# Patient Record
Sex: FEMALE | Race: ASIAN | NOT HISPANIC OR LATINO
[De-identification: names, ages, dates, MRNs, and addresses within clinical notes are randomized per-mention and may not be internally consistent; named-entity substitution may affect disease eponyms.]

---

## 2024-01-03 PROBLEM — Z00.00 ENCOUNTER FOR PREVENTIVE HEALTH EXAMINATION: Status: ACTIVE | Noted: 2024-01-03

## 2024-01-08 ENCOUNTER — LABORATORY RESULT (OUTPATIENT)
Age: 39
End: 2024-01-08

## 2024-01-08 ENCOUNTER — APPOINTMENT (OUTPATIENT)
Dept: OBGYN | Facility: CLINIC | Age: 39
End: 2024-01-08
Payer: SELF-PAY

## 2024-01-08 ENCOUNTER — ASOB RESULT (OUTPATIENT)
Age: 39
End: 2024-01-08

## 2024-01-08 VITALS
DIASTOLIC BLOOD PRESSURE: 72 MMHG | BODY MASS INDEX: 26.03 KG/M2 | HEART RATE: 84 BPM | OXYGEN SATURATION: 100 % | SYSTOLIC BLOOD PRESSURE: 112 MMHG | HEIGHT: 66 IN | WEIGHT: 162 LBS

## 2024-01-08 DIAGNOSIS — C73 MALIGNANT NEOPLASM OF THYROID GLAND: ICD-10-CM

## 2024-01-08 DIAGNOSIS — E07.9 DISORDER OF THYROID, UNSPECIFIED: ICD-10-CM

## 2024-01-08 PROCEDURE — 99213 OFFICE O/P EST LOW 20 MIN: CPT

## 2024-01-08 PROCEDURE — 76815 OB US LIMITED FETUS(S): CPT

## 2024-01-08 RX ORDER — LEVOTHYROXINE SODIUM 50 UG/1
50 TABLET ORAL
Refills: 0 | Status: ACTIVE | COMMUNITY

## 2024-01-08 RX ORDER — LEVOTHYROXINE SODIUM 0.17 MG/1
TABLET ORAL
Refills: 0 | Status: ACTIVE | COMMUNITY

## 2024-01-16 ENCOUNTER — NON-APPOINTMENT (OUTPATIENT)
Age: 39
End: 2024-01-16

## 2024-01-22 ENCOUNTER — APPOINTMENT (OUTPATIENT)
Dept: OBGYN | Facility: CLINIC | Age: 39
End: 2024-01-22
Payer: SELF-PAY

## 2024-01-22 VITALS
WEIGHT: 163 LBS | OXYGEN SATURATION: 99 % | HEART RATE: 85 BPM | BODY MASS INDEX: 26.31 KG/M2 | DIASTOLIC BLOOD PRESSURE: 76 MMHG | SYSTOLIC BLOOD PRESSURE: 109 MMHG

## 2024-01-22 PROCEDURE — 99213 OFFICE O/P EST LOW 20 MIN: CPT

## 2024-01-24 ENCOUNTER — NON-APPOINTMENT (OUTPATIENT)
Age: 39
End: 2024-01-24

## 2024-01-29 ENCOUNTER — APPOINTMENT (OUTPATIENT)
Dept: OBGYN | Facility: CLINIC | Age: 39
End: 2024-01-29
Payer: SELF-PAY

## 2024-01-29 ENCOUNTER — LABORATORY RESULT (OUTPATIENT)
Age: 39
End: 2024-01-29

## 2024-01-29 VITALS
SYSTOLIC BLOOD PRESSURE: 109 MMHG | DIASTOLIC BLOOD PRESSURE: 76 MMHG | OXYGEN SATURATION: 100 % | BODY MASS INDEX: 26.79 KG/M2 | WEIGHT: 166 LBS | HEART RATE: 91 BPM

## 2024-01-29 PROCEDURE — 99213 OFFICE O/P EST LOW 20 MIN: CPT

## 2024-02-05 ENCOUNTER — APPOINTMENT (OUTPATIENT)
Dept: OBGYN | Facility: CLINIC | Age: 39
End: 2024-02-05
Payer: SELF-PAY

## 2024-02-05 VITALS
WEIGHT: 168 LBS | HEART RATE: 97 BPM | BODY MASS INDEX: 27.12 KG/M2 | OXYGEN SATURATION: 98 % | DIASTOLIC BLOOD PRESSURE: 76 MMHG | SYSTOLIC BLOOD PRESSURE: 116 MMHG

## 2024-02-05 PROCEDURE — 99213 OFFICE O/P EST LOW 20 MIN: CPT

## 2024-02-12 ENCOUNTER — APPOINTMENT (OUTPATIENT)
Dept: OBGYN | Facility: CLINIC | Age: 39
End: 2024-02-12
Payer: SELF-PAY

## 2024-02-12 VITALS
WEIGHT: 170 LBS | DIASTOLIC BLOOD PRESSURE: 72 MMHG | HEART RATE: 79 BPM | OXYGEN SATURATION: 99 % | SYSTOLIC BLOOD PRESSURE: 110 MMHG | BODY MASS INDEX: 27.44 KG/M2

## 2024-02-12 PROCEDURE — 99213 OFFICE O/P EST LOW 20 MIN: CPT

## 2024-02-20 ENCOUNTER — NON-APPOINTMENT (OUTPATIENT)
Age: 39
End: 2024-02-20

## 2024-02-20 ENCOUNTER — APPOINTMENT (OUTPATIENT)
Dept: OBGYN | Facility: CLINIC | Age: 39
End: 2024-02-20
Payer: SELF-PAY

## 2024-02-20 VITALS — DIASTOLIC BLOOD PRESSURE: 80 MMHG | BODY MASS INDEX: 27.44 KG/M2 | WEIGHT: 170 LBS | SYSTOLIC BLOOD PRESSURE: 119 MMHG

## 2024-02-20 PROCEDURE — 99213 OFFICE O/P EST LOW 20 MIN: CPT

## 2024-02-21 ENCOUNTER — APPOINTMENT (OUTPATIENT)
Dept: ANTEPARTUM | Facility: CLINIC | Age: 39
End: 2024-02-21

## 2024-02-22 ENCOUNTER — INPATIENT (INPATIENT)
Facility: HOSPITAL | Age: 39
LOS: 1 days | Discharge: HOME | End: 2024-02-24
Attending: OBSTETRICS & GYNECOLOGY | Admitting: OBSTETRICS & GYNECOLOGY
Payer: SELF-PAY

## 2024-02-22 VITALS
OXYGEN SATURATION: 98 % | SYSTOLIC BLOOD PRESSURE: 110 MMHG | RESPIRATION RATE: 17 BRPM | DIASTOLIC BLOOD PRESSURE: 84 MMHG | HEART RATE: 73 BPM | TEMPERATURE: 98 F

## 2024-02-22 DIAGNOSIS — Z34.80 ENCOUNTER FOR SUPERVISION OF OTHER NORMAL PREGNANCY, UNSPECIFIED TRIMESTER: ICD-10-CM

## 2024-02-22 DIAGNOSIS — E89.0 POSTPROCEDURAL HYPOTHYROIDISM: Chronic | ICD-10-CM

## 2024-02-22 DIAGNOSIS — O26.899 OTHER SPECIFIED PREGNANCY RELATED CONDITIONS, UNSPECIFIED TRIMESTER: ICD-10-CM

## 2024-02-22 LAB
APTT BLD: 29.2 SEC — SIGNIFICANT CHANGE UP (ref 24.5–35.6)
BASOPHILS # BLD AUTO: 0.02 K/UL — SIGNIFICANT CHANGE UP (ref 0–0.2)
BASOPHILS NFR BLD AUTO: 0.3 % — SIGNIFICANT CHANGE UP (ref 0–2)
BLD GP AB SCN SERPL QL: SIGNIFICANT CHANGE UP
EOSINOPHIL # BLD AUTO: 0.06 K/UL — SIGNIFICANT CHANGE UP (ref 0–0.5)
EOSINOPHIL NFR BLD AUTO: 1 % — SIGNIFICANT CHANGE UP (ref 0–6)
HBV SURFACE AG SERPL QL IA: SIGNIFICANT CHANGE UP
HCT VFR BLD CALC: 33.9 % — LOW (ref 34.5–45)
HGB BLD-MCNC: 11.5 G/DL — SIGNIFICANT CHANGE UP (ref 11.5–15.5)
HIV 1+2 AB+HIV1 P24 AG SERPL QL IA: SIGNIFICANT CHANGE UP
IMM GRANULOCYTES NFR BLD AUTO: 1.5 % — HIGH (ref 0–0.9)
INR BLD: 0.93 RATIO — SIGNIFICANT CHANGE UP (ref 0.85–1.18)
LYMPHOCYTES # BLD AUTO: 1.27 K/UL — SIGNIFICANT CHANGE UP (ref 1–3.3)
LYMPHOCYTES # BLD AUTO: 21.2 % — SIGNIFICANT CHANGE UP (ref 13–44)
MCHC RBC-ENTMCNC: 30.4 PG — SIGNIFICANT CHANGE UP (ref 27–34)
MCHC RBC-ENTMCNC: 33.9 GM/DL — SIGNIFICANT CHANGE UP (ref 32–36)
MCV RBC AUTO: 89.7 FL — SIGNIFICANT CHANGE UP (ref 80–100)
MONOCYTES # BLD AUTO: 0.42 K/UL — SIGNIFICANT CHANGE UP (ref 0–0.9)
MONOCYTES NFR BLD AUTO: 7 % — SIGNIFICANT CHANGE UP (ref 2–14)
NEUTROPHILS # BLD AUTO: 4.13 K/UL — SIGNIFICANT CHANGE UP (ref 1.8–7.4)
NEUTROPHILS NFR BLD AUTO: 69 % — SIGNIFICANT CHANGE UP (ref 43–77)
NRBC # BLD: 0 /100 WBCS — SIGNIFICANT CHANGE UP (ref 0–0)
PLATELET # BLD AUTO: 173 K/UL — SIGNIFICANT CHANGE UP (ref 150–400)
PROTHROM AB SERPL-ACNC: 10.6 SEC — SIGNIFICANT CHANGE UP (ref 9.5–13)
RBC # BLD: 3.78 M/UL — LOW (ref 3.8–5.2)
RBC # FLD: 13.9 % — SIGNIFICANT CHANGE UP (ref 10.3–14.5)
RUBV IGG SER-ACNC: 5.4 INDEX — SIGNIFICANT CHANGE UP
RUBV IGG SER-IMP: POSITIVE — SIGNIFICANT CHANGE UP
WBC # BLD: 5.99 K/UL — SIGNIFICANT CHANGE UP (ref 3.8–10.5)
WBC # FLD AUTO: 5.99 K/UL — SIGNIFICANT CHANGE UP (ref 3.8–10.5)

## 2024-02-22 PROCEDURE — 59409 OBSTETRICAL CARE: CPT

## 2024-02-22 RX ORDER — OXYTOCIN 10 UNIT/ML
41.67 VIAL (ML) INJECTION
Qty: 20 | Refills: 0 | Status: DISCONTINUED | OUTPATIENT
Start: 2024-02-22 | End: 2024-02-24

## 2024-02-22 RX ORDER — SODIUM CHLORIDE 9 MG/ML
3 INJECTION INTRAMUSCULAR; INTRAVENOUS; SUBCUTANEOUS EVERY 8 HOURS
Refills: 0 | Status: DISCONTINUED | OUTPATIENT
Start: 2024-02-22 | End: 2024-02-24

## 2024-02-22 RX ORDER — CHLORHEXIDINE GLUCONATE 213 G/1000ML
1 SOLUTION TOPICAL DAILY
Refills: 0 | Status: DISCONTINUED | OUTPATIENT
Start: 2024-02-22 | End: 2024-02-22

## 2024-02-22 RX ORDER — LANOLIN
1 OINTMENT (GRAM) TOPICAL EVERY 6 HOURS
Refills: 0 | Status: DISCONTINUED | OUTPATIENT
Start: 2024-02-22 | End: 2024-02-24

## 2024-02-22 RX ORDER — OXYTOCIN 10 UNIT/ML
VIAL (ML) INJECTION
Qty: 30 | Refills: 0 | Status: DISCONTINUED | OUTPATIENT
Start: 2024-02-22 | End: 2024-02-22

## 2024-02-22 RX ORDER — SIMETHICONE 80 MG/1
80 TABLET, CHEWABLE ORAL EVERY 4 HOURS
Refills: 0 | Status: DISCONTINUED | OUTPATIENT
Start: 2024-02-22 | End: 2024-02-24

## 2024-02-22 RX ORDER — LEVOTHYROXINE SODIUM 125 MCG
25 TABLET ORAL DAILY
Refills: 0 | Status: DISCONTINUED | OUTPATIENT
Start: 2024-02-22 | End: 2024-02-24

## 2024-02-22 RX ORDER — TETANUS TOXOID, REDUCED DIPHTHERIA TOXOID AND ACELLULAR PERTUSSIS VACCINE, ADSORBED 5; 2.5; 8; 8; 2.5 [IU]/.5ML; [IU]/.5ML; UG/.5ML; UG/.5ML; UG/.5ML
0.5 SUSPENSION INTRAMUSCULAR ONCE
Refills: 0 | Status: DISCONTINUED | OUTPATIENT
Start: 2024-02-22 | End: 2024-02-24

## 2024-02-22 RX ORDER — DIPHENHYDRAMINE HCL 50 MG
25 CAPSULE ORAL EVERY 6 HOURS
Refills: 0 | Status: DISCONTINUED | OUTPATIENT
Start: 2024-02-22 | End: 2024-02-24

## 2024-02-22 RX ORDER — KETOROLAC TROMETHAMINE 30 MG/ML
30 SYRINGE (ML) INJECTION ONCE
Refills: 0 | Status: DISCONTINUED | OUTPATIENT
Start: 2024-02-22 | End: 2024-02-24

## 2024-02-22 RX ORDER — HYDROCORTISONE 1 %
1 OINTMENT (GRAM) TOPICAL EVERY 6 HOURS
Refills: 0 | Status: DISCONTINUED | OUTPATIENT
Start: 2024-02-22 | End: 2024-02-24

## 2024-02-22 RX ORDER — CITRIC ACID/SODIUM CITRATE 300-500 MG
15 SOLUTION, ORAL ORAL EVERY 6 HOURS
Refills: 0 | Status: DISCONTINUED | OUTPATIENT
Start: 2024-02-22 | End: 2024-02-22

## 2024-02-22 RX ORDER — FERROUS SULFATE 325(65) MG
325 TABLET ORAL DAILY
Refills: 0 | Status: DISCONTINUED | OUTPATIENT
Start: 2024-02-22 | End: 2024-02-24

## 2024-02-22 RX ORDER — BENZOCAINE 10 %
1 GEL (GRAM) MUCOUS MEMBRANE EVERY 6 HOURS
Refills: 0 | Status: DISCONTINUED | OUTPATIENT
Start: 2024-02-22 | End: 2024-02-24

## 2024-02-22 RX ORDER — MAGNESIUM HYDROXIDE 400 MG/1
30 TABLET, CHEWABLE ORAL
Refills: 0 | Status: DISCONTINUED | OUTPATIENT
Start: 2024-02-22 | End: 2024-02-24

## 2024-02-22 RX ORDER — ASCORBIC ACID 60 MG
500 TABLET,CHEWABLE ORAL DAILY
Refills: 0 | Status: DISCONTINUED | OUTPATIENT
Start: 2024-02-22 | End: 2024-02-24

## 2024-02-22 RX ORDER — ACETAMINOPHEN 500 MG
975 TABLET ORAL EVERY 6 HOURS
Refills: 0 | Status: DISCONTINUED | OUTPATIENT
Start: 2024-02-22 | End: 2024-02-24

## 2024-02-22 RX ORDER — SODIUM CHLORIDE 9 MG/ML
1000 INJECTION, SOLUTION INTRAVENOUS
Refills: 0 | Status: DISCONTINUED | OUTPATIENT
Start: 2024-02-22 | End: 2024-02-22

## 2024-02-22 RX ORDER — OXYTOCIN 10 UNIT/ML
333.33 VIAL (ML) INJECTION
Qty: 20 | Refills: 0 | Status: DISCONTINUED | OUTPATIENT
Start: 2024-02-22 | End: 2024-02-24

## 2024-02-22 RX ORDER — DIBUCAINE 1 %
1 OINTMENT (GRAM) RECTAL EVERY 6 HOURS
Refills: 0 | Status: DISCONTINUED | OUTPATIENT
Start: 2024-02-22 | End: 2024-02-24

## 2024-02-22 RX ORDER — PRAMOXINE HYDROCHLORIDE 150 MG/15G
1 AEROSOL, FOAM RECTAL EVERY 4 HOURS
Refills: 0 | Status: DISCONTINUED | OUTPATIENT
Start: 2024-02-22 | End: 2024-02-24

## 2024-02-22 RX ORDER — IBUPROFEN 200 MG
600 TABLET ORAL EVERY 6 HOURS
Refills: 0 | Status: COMPLETED | OUTPATIENT
Start: 2024-02-22 | End: 2025-01-20

## 2024-02-22 RX ORDER — LEVOTHYROXINE SODIUM 125 MCG
1 TABLET ORAL
Refills: 0 | DISCHARGE

## 2024-02-22 RX ORDER — AER TRAVELER 0.5 G/1
1 SOLUTION RECTAL; TOPICAL EVERY 4 HOURS
Refills: 0 | Status: DISCONTINUED | OUTPATIENT
Start: 2024-02-22 | End: 2024-02-24

## 2024-02-22 RX ORDER — OXYCODONE HYDROCHLORIDE 5 MG/1
5 TABLET ORAL EVERY 4 HOURS
Refills: 0 | Status: DISCONTINUED | OUTPATIENT
Start: 2024-02-22 | End: 2024-02-24

## 2024-02-22 RX ADMIN — SODIUM CHLORIDE 125 MILLILITER(S): 9 INJECTION, SOLUTION INTRAVENOUS at 07:00

## 2024-02-22 RX ADMIN — Medication 1 SPRAY(S): at 22:56

## 2024-02-22 RX ADMIN — Medication 125 MILLIUNIT(S)/MIN: at 18:53

## 2024-02-22 RX ADMIN — Medication 25 MICROGRAM(S): at 16:43

## 2024-02-22 RX ADMIN — SODIUM CHLORIDE 3 MILLILITER(S): 9 INJECTION INTRAMUSCULAR; INTRAVENOUS; SUBCUTANEOUS at 21:11

## 2024-02-22 RX ADMIN — Medication 2 MILLIUNIT(S)/MIN: at 11:27

## 2024-02-22 NOTE — OB PROVIDER IHI INDUCTION/AUGMENTATION NOTE - NS_EFFACEMANT_OBGYN_ALL_OB_NU
Patient presented after waiting 30 minutes with no reaction to allergy injections. Discharged from clinic.    Tammi Do RN    
72

## 2024-02-22 NOTE — OB PROVIDER DELIVERY SUMMARY - NSPROVIDERDELIVERYNOTE_OBGYN_ALL_OB_FT
delivery of a viable male infant over a 1st degree midline peroneal laceration repaired with 2-0 chromic . Lidocaine 1% 5 cc injected as local . Laceration repaired . placenta delivered complete and spontaneously . No cervical or other vaginal lacerations .

## 2024-02-22 NOTE — OB PROVIDER LABOR PROGRESS NOTE - NS_OBIHIFHRDETAILS_OBGYN_ALL_OB_FT
fht baseline 120, moderate variability, +accels, +early decels noted to have spontaneous decel x 2 minutes  toco q 2-3min    ivf bolus started   iupc placed amnio infusion started  pit turned off
BASELINE 120, moderate variability, +accels, +early decels
baseline 120, moderate variability, +accels, no decels
fht baseline 120, moderate variability, +accels, no decels   toco q3-4min

## 2024-02-22 NOTE — OB PROVIDER H&P - NSHPROSALLOTHERNEGRD_GEN_ALL_CORE
(0) no particular expression or smile/(0) content, relaxed/(0) lying quietly, normal position, moves easily/(0) normal position or relaxed/(0) no cry (awake or asleep)
All other review of systems negative, except as noted in HPI

## 2024-02-22 NOTE — OB RN PATIENT PROFILE - NS_OBGYNHISTORY_OBGYN_ALL_OB_FT
3/9/2012 39 wks Girl 3.25 Kg  Thyroid removal in  and hypothyroidism in  on Euthyrox 2.5 mcg daily   TOP x1 at 2 wks 10/2022

## 2024-02-22 NOTE — OB PROVIDER TRIAGE NOTE - HISTORY OF PRESENT ILLNESS
37 yo  @ 39.3 wks presenting grossly ruptured. Patient states she felt a big gush of water at 4am with continued leaking which prompted her to come in. Patient states she feels tightening of her belly but no painful contractions. Patient states she has felt decreased fm, denies vb.    [facility]- Dr. Lott no comp    ObHx:  @ 39.3  P1 2012 FT nsv 7lbs girl  P2 metop @ 10 wks   GynHx: Pt denies h/o abnl pap smear, fibroids, cysts or stds  Pmhx: Hypothyroidism on Euthorax 2.5mg  Pshx: Thyroidectomy   Allergies: nkda  Meds: Euthorax 2.5mg  Soc Hx: Denies etoh/tobacco/drug use  Psych: Denies depression, anxiety, trauma or abuse

## 2024-02-22 NOTE — OB PROVIDER TRIAGE NOTE - NSOBPROVIDERNOTE_OBGYN_ALL_OB_FT
37 yo  @ 39.3 wks presenting in early labor grossly ruptured. Patient is stable  - admit to labor and delivery  - f/up prenatals  - t&s, rpr, hiv, hep b, cbc  - for pitocin  - d/w Dr.Garcia verma attending

## 2024-02-22 NOTE — OB PROVIDER TRIAGE NOTE - NS_AMNISUREQC_OBGYN_ALL_OB
From: Krystal Berkowitz  To: Bran Villalpando  Sent: 5/24/2021 8:00 AM CDT  Subject: Non-Urgent Medical Question    Hey Dr. Villalpando     You don't have any appts until late June   Something is wrong with me  I keep bruising really bad and spotting  And I just don't feel well on and off since the stupid vaccine  Maybe can you run my labs? Maybe my irons low wade   Not Done

## 2024-02-22 NOTE — OB PROVIDER H&P - HISTORY OF PRESENT ILLNESS
39 yo  @ 39.3 wks presenting grossly ruptured. Patient states she felt a big gush of water at 4am with continued leaking which prompted her to come in. Patient states she feels tightening of her belly but no painful contractions. Patient states she has felt decreased fm, denies vb.    [facility]- Dr. Lott no comp    ObHx:  @ 39.3  P1 2012 FT nsv 7lbs girl  P2 metop @ 10 wks   GynHx: Pt denies h/o abnl pap smear, fibroids, cysts or stds  Pmhx: Hypothyroidism on Euthorax 2.5mg  Pshx: Thyroidectomy   Allergies: nkda  Meds: Euthorax 2.5mg  Soc Hx: Denies etoh/tobacco/drug use  Psych: Denies depression, anxiety, trauma or abuse

## 2024-02-22 NOTE — OB PROVIDER TRIAGE NOTE - NSHPPHYSICALEXAM_GEN_ALL_CORE
gen: Pt appears comfortable  Abd: Gravid, non tender, not guarding  Ext: No edema  SVE:4/60/-2 grossly ruptured clr

## 2024-02-22 NOTE — OB PROVIDER H&P - NSLOWPPHRISK_OBGYN_A_OB
No previous uterine incision/Curiel Pregnancy/Less than or equal to 4 previous vaginal births/No known bleeding disorder/No history of postpartum hemorrhage/No other PPH risks indicated

## 2024-02-22 NOTE — OB PROVIDER LABOR PROGRESS NOTE - NS_SUBJECTIVE/OBJECTIVE_OBGYN_ALL_OB_FT
pt seen and evaluated at bedside   RN May present for interpretation   pt comfortable at this time, tolerating contractions well declining pain management
pt offers no complaints   interpretation via 
pt s/p epidural placement still complaining of strong contraction pain
pt seen and evaluated at bedside, comfortable s/p top off

## 2024-02-22 NOTE — OB PROVIDER LABOR PROGRESS NOTE - ASSESSMENT
37yo  @39.3wks admitted in early labor with grossly srom clear fluid, gbs negative, efw 3000, late transfer from [place] in  stable   - cont close maternal and fetal monitoring  - pain management per pt request   - for pitocin augmentation 2x2   - discussed with dr. Lott, Dr. Garcia to cover  - case and fht monitored by dr. fuentes, house attending   
39yo  @39.3wks admitted in early labor with grossly srom clear fluid, gbs negative, efw 3000, late transfer from [place] in January, pt stable   - cont close maternal and fetal monitoring  - pain management in place with epidural  - amnioinfusion 250cc NS bolus, 50cc NS maitenance   - reposition with peanut ball   - continue pitocin augmentation per protocol  - hold for 30 minutes maximum per dr. tyler as tracing has recovered   - discussed with Dr. Tyler  - case and fht monitored by bayron willams attending   
39yo  @39.3wks admitted in early labor with grossly srom clear fluid, gbs negative, efw 3000, late transfer from [place] in January, pt stable   - cont close maternal and fetal monitoring  - pain management per pt request   - continue pitocin augmentation per protocol   - discussed with Dr. Garcia  - case and fht monitored by dr. fuentes, [place] attending   
37yo  @39.3wks admitted in early labor with grossly srom clear fluid, gbs negative, efw 3000, late transfer from [place] in January, pt stable   - cont close maternal and fetal monitoring  - pain management per pt request   - continue pitocin augmentation per protocol   - discussed with Dr. Garcia  - case and fht monitored by dr. fuentes, [place] attending

## 2024-02-22 NOTE — OB RN TRIAGE NOTE - NS_OBGYNHISTORY_OBGYN_ALL_OB_FT
3/9/2012 39 wks Girl 3.25 Kg  Thyroid removal in    TOP x1 at 2 wks 10/2022  3/9/2012 39 wks Girl 3.25 Kg  Thyroid removal in  and hypothyroidism in  on Euthyrox 2.5 mcg daily   TOP x1 at 2 wks 10/2022

## 2024-02-22 NOTE — OB PROVIDER H&P - ASSESSMENT
A/P 37 yo  @ 39.3 wks presenting in early labor grossly ruptured. Patient is stable  - admit to l&d  - f/up prenatals  - f/up labs  - for possible pitocin  - d/w Dr. Uday verma attending

## 2024-02-22 NOTE — OB PROVIDER H&P - NSHPPHYSICALEXAM_GEN_ALL_CORE
Gen: Pt appears well, nad  Abd: Gravid, non tender, not guarding  Ext: no edema  SVE: 4/60/-2 grossly ruptured clr

## 2024-02-23 ENCOUNTER — APPOINTMENT (OUTPATIENT)
Dept: ANTEPARTUM | Facility: CLINIC | Age: 39
End: 2024-02-23

## 2024-02-23 ENCOUNTER — TRANSCRIPTION ENCOUNTER (OUTPATIENT)
Age: 39
End: 2024-02-23

## 2024-02-23 LAB
BASOPHILS # BLD AUTO: 0.03 K/UL — SIGNIFICANT CHANGE UP (ref 0–0.2)
BASOPHILS NFR BLD AUTO: 0.2 % — SIGNIFICANT CHANGE UP (ref 0–2)
EOSINOPHIL # BLD AUTO: 0.03 K/UL — SIGNIFICANT CHANGE UP (ref 0–0.5)
EOSINOPHIL NFR BLD AUTO: 0.2 % — SIGNIFICANT CHANGE UP (ref 0–6)
HCT VFR BLD CALC: 29 % — LOW (ref 34.5–45)
HGB BLD-MCNC: 9.9 G/DL — LOW (ref 11.5–15.5)
IMM GRANULOCYTES NFR BLD AUTO: 0.6 % — SIGNIFICANT CHANGE UP (ref 0–0.9)
LYMPHOCYTES # BLD AUTO: 0.93 K/UL — LOW (ref 1–3.3)
LYMPHOCYTES # BLD AUTO: 6.5 % — LOW (ref 13–44)
MCHC RBC-ENTMCNC: 30.7 PG — SIGNIFICANT CHANGE UP (ref 27–34)
MCHC RBC-ENTMCNC: 34.1 GM/DL — SIGNIFICANT CHANGE UP (ref 32–36)
MCV RBC AUTO: 89.8 FL — SIGNIFICANT CHANGE UP (ref 80–100)
MONOCYTES # BLD AUTO: 0.68 K/UL — SIGNIFICANT CHANGE UP (ref 0–0.9)
MONOCYTES NFR BLD AUTO: 4.7 % — SIGNIFICANT CHANGE UP (ref 2–14)
NEUTROPHILS # BLD AUTO: 12.62 K/UL — HIGH (ref 1.8–7.4)
NEUTROPHILS NFR BLD AUTO: 87.8 % — HIGH (ref 43–77)
NRBC # BLD: 0 /100 WBCS — SIGNIFICANT CHANGE UP (ref 0–0)
PLATELET # BLD AUTO: 173 K/UL — SIGNIFICANT CHANGE UP (ref 150–400)
RBC # BLD: 3.23 M/UL — LOW (ref 3.8–5.2)
RBC # FLD: 13.6 % — SIGNIFICANT CHANGE UP (ref 10.3–14.5)
T PALLIDUM AB TITR SER: NEGATIVE — SIGNIFICANT CHANGE UP
WBC # BLD: 14.37 K/UL — HIGH (ref 3.8–10.5)
WBC # FLD AUTO: 14.37 K/UL — HIGH (ref 3.8–10.5)

## 2024-02-23 RX ORDER — IBUPROFEN 200 MG
600 TABLET ORAL EVERY 6 HOURS
Refills: 0 | Status: DISCONTINUED | OUTPATIENT
Start: 2024-02-23 | End: 2024-02-24

## 2024-02-23 RX ADMIN — SODIUM CHLORIDE 3 MILLILITER(S): 9 INJECTION INTRAMUSCULAR; INTRAVENOUS; SUBCUTANEOUS at 14:00

## 2024-02-23 RX ADMIN — Medication 975 MILLIGRAM(S): at 09:50

## 2024-02-23 RX ADMIN — Medication 25 MICROGRAM(S): at 05:39

## 2024-02-23 RX ADMIN — Medication 975 MILLIGRAM(S): at 19:08

## 2024-02-23 RX ADMIN — Medication 1 TABLET(S): at 11:32

## 2024-02-23 RX ADMIN — Medication 975 MILLIGRAM(S): at 09:19

## 2024-02-23 RX ADMIN — SODIUM CHLORIDE 3 MILLILITER(S): 9 INJECTION INTRAMUSCULAR; INTRAVENOUS; SUBCUTANEOUS at 05:56

## 2024-02-23 RX ADMIN — Medication 500 MILLIGRAM(S): at 11:32

## 2024-02-23 RX ADMIN — Medication 975 MILLIGRAM(S): at 20:08

## 2024-02-23 RX ADMIN — Medication 325 MILLIGRAM(S): at 11:32

## 2024-02-23 NOTE — DISCHARGE NOTE OB - CARE PLAN
1 Principal Discharge DX:	 (normal spontaneous vaginal delivery)  Assessment and plan of treatment:	No sex, no pushing, no heavy lifting, no strenuous activity, Nothing per vagina x  6 weeks - no sex, tampons, douching, tub baths, etc. Continue breastfeeding.  Motrin as needed for pain. Follow up in office in 5-6 weeks for post partum check up. Please call for appt.  Secondary Diagnosis:	Hypothyroid  Assessment and plan of treatment:	follow up with PCP for management of hypothyroidism   Principal Discharge DX:	 (normal spontaneous vaginal delivery)  Assessment and plan of treatment:	No sex, no pushing, no heavy lifting, no strenuous activity, Nothing per vagina x  6 weeks - no sex, tampons, douching, tub baths, etc. Continue breastfeeding.  Motrin as needed for pain. Follow up in office in 5-6 weeks for post partum check up. Please call for appt.  Secondary Diagnosis:	Hypothyroid  Assessment and plan of treatment:	follow up with PCP for management of hypothyroidism  continue present euthyroxine

## 2024-02-23 NOTE — DISCHARGE NOTE OB - CARE PROVIDER_API CALL
Cindy Lott (MD; MS)  Obstetrics and Gynecology  [address], [address] [place] [address]  Phone: [phone]  Fax: [phone]  Follow Up Time: 1 month

## 2024-02-23 NOTE — DISCHARGE NOTE OB - PLAN OF CARE
No sex, no pushing, no heavy lifting, no strenuous activity, Nothing per vagina x  6 weeks - no sex, tampons, douching, tub baths, etc. Continue breastfeeding.  Motrin as needed for pain. Follow up in office in 5-6 weeks for post partum check up. Please call for appt. follow up with PCP for management of hypothyroidism follow up with PCP for management of hypothyroidism  continue present euthyroxine

## 2024-02-23 NOTE — PROGRESS NOTE ADULT - SUBJECTIVE AND OBJECTIVE BOX
Patient seen at bedside resting comfortably offers no current complaints. Ambulating and voiding without difficulty.  Passing flatus and tolerating regular diet.  both breast/bottle feeding . Denies HA, CP, SOB, N/V/D, dizziness, palpitations, worsening abdominal pain, worsening vaginal bleeding, or any other concerns.    Vital Signs Last 24 Hrs  T(C): 36.9 (2024 04:55), Max: 37.1 (2024 01:00)  T(F): 98.4 (2024 04:55), Max: 98.8 (2024 01:00)  HR: 86 (2024 04:55) (66 - 90)  BP: 121/85 (2024 04:55) (103/63 - 130/63)  BP(mean): 97 (2024 04:55) (76 - 97)  RR: 17 (2024 04:55) (17 - 18)  SpO2: 100% (2024 04:55) (95% - 100%)    Parameters below as of 2024 04:55  Patient On (Oxygen Delivery Method): room air        Physical Exam:     Gen: A&Ox 3, NAD  Chest: CTA B/L  Cardiac: S1+S2; RRR  Breast: Soft, nontender, nonengorged  Abdomen: +Bs; Soft, nontender,  ND; Fundus firm below umbilicus  Gyn: mod lochia, intact/repaired  Ext: Nontender, DTRS 2+, no worsening edema                          9.9    14.37 )-----------( 173      ( 2024 06:20 )             29.0       A/P: 38 year old PPD#1 s/p     -Continue pain management  -Encourage OOB and ambulation  -Continue current care  -Plan for discharge tomorrow  -d/w dr. Garcia

## 2024-02-23 NOTE — DISCHARGE NOTE OB - PATIENT PORTAL LINK FT
You can access the FollowMyHealth Patient Portal offered by Health system by registering at the following website: [email or web address]. By joining [facility]’s FollowMyHealth portal, you will also be able to view your health information using other applications (apps) compatible with our system.

## 2024-02-23 NOTE — DISCHARGE NOTE OB - NS MD DC FALL RISK RISK
For information on Fall & Injury Prevention, visit: [email or web address] OR  [email or web address] OR  https://www.cdc.gov/steadi/patient.html

## 2024-02-23 NOTE — DISCHARGE NOTE OB - MEDICATION SUMMARY - MEDICATIONS TO TAKE
I will START or STAY ON the medications listed below when I get home from the hospital:    ibuprofen 600 mg oral tablet  -- 1 tab(s) by mouth every 6 hours as needed for Moderate Pain (4 - 6) MDD: 4  -- Indication: For pain med    hydrocortisone 1% topical cream  -- 1 Apply on skin to affected area every 6 hours As needed Moderate Pain (4-6)  -- Indication: For itching rash    Prenatal 1 oral capsule  -- orally once a day  -- Indication: For vitamin    Euthyrox 25 mcg (0.025 mg) oral tablet  -- 1 tab(s) by mouth once a day  -- Indication: For Hypothyroid    ascorbic acid 500 mg oral tablet  -- 1 tab(s) by mouth once a day  -- Indication: For anemia

## 2024-02-23 NOTE — LACTATION INITIAL EVALUATION - LACTATION INTERVENTIONS
Breastfeeding on cue 8-12X/24 hours with diaper count to assess for adequate intake, safe skin to skin and rooming-in encouraged./initiate/review safe skin-to-skin/initiate/review hand expression/review techniques to increase milk supply/reviewed components of an effective feeding and at least 8 effective feedings per day required/reviewed importance of monitoring infant diapers, the breastfeeding log, and minimum output each day/reviewed risks of unnecessary formula supplementation/reviewed risks of artificial nipples/reviewed benefits and recommendations for rooming in/reviewed feeding on demand/by cue at least 8 times a day/reviewed indications of inadequate milk transfer that would require supplementation

## 2024-02-23 NOTE — DISCHARGE NOTE OB - HOSPITAL COURSE
37yo admitted in early labor s/p  @39.3wks, h/o hypothyroidism on synthroid 25mcg, uncomplicated delivery and postpartum care, pt stable

## 2024-02-23 NOTE — PROGRESS NOTE ADULT - PROBLEM SELECTOR PLAN 1
-Continue pain management  -Encourage OOB and ambulation  -Continue current care  -Plan for discharge tomorrow  -d/w dr. Garcia

## 2024-02-24 VITALS
HEART RATE: 60 BPM | SYSTOLIC BLOOD PRESSURE: 112 MMHG | TEMPERATURE: 97 F | DIASTOLIC BLOOD PRESSURE: 76 MMHG | OXYGEN SATURATION: 97 % | RESPIRATION RATE: 18 BRPM

## 2024-02-24 PROCEDURE — 85730 THROMBOPLASTIN TIME PARTIAL: CPT

## 2024-02-24 PROCEDURE — 59050 FETAL MONITOR W/REPORT: CPT

## 2024-02-24 PROCEDURE — 86780 TREPONEMA PALLIDUM: CPT

## 2024-02-24 PROCEDURE — 86762 RUBELLA ANTIBODY: CPT

## 2024-02-24 PROCEDURE — 85610 PROTHROMBIN TIME: CPT

## 2024-02-24 PROCEDURE — 86900 BLOOD TYPING SEROLOGIC ABO: CPT

## 2024-02-24 PROCEDURE — 86850 RBC ANTIBODY SCREEN: CPT

## 2024-02-24 PROCEDURE — 36415 COLL VENOUS BLD VENIPUNCTURE: CPT

## 2024-02-24 PROCEDURE — 85025 COMPLETE CBC W/AUTO DIFF WBC: CPT

## 2024-02-24 PROCEDURE — 87340 HEPATITIS B SURFACE AG IA: CPT

## 2024-02-24 PROCEDURE — 86901 BLOOD TYPING SEROLOGIC RH(D): CPT

## 2024-02-24 PROCEDURE — 87389 HIV-1 AG W/HIV-1&-2 AB AG IA: CPT

## 2024-02-24 PROCEDURE — 90686 IIV4 VACC NO PRSV 0.5 ML IM: CPT

## 2024-02-24 RX ORDER — HYDROCORTISONE 1 %
1 OINTMENT (GRAM) TOPICAL
Qty: 0 | Refills: 0 | DISCHARGE
Start: 2024-02-24

## 2024-02-24 RX ORDER — INFLUENZA VIRUS VACCINE 15; 15; 15; 15 UG/.5ML; UG/.5ML; UG/.5ML; UG/.5ML
0.5 SUSPENSION INTRAMUSCULAR ONCE
Refills: 0 | Status: COMPLETED | OUTPATIENT
Start: 2024-02-24 | End: 2024-02-24

## 2024-02-24 RX ORDER — ASCORBIC ACID 60 MG
1 TABLET,CHEWABLE ORAL
Qty: 0 | Refills: 0 | DISCHARGE
Start: 2024-02-24

## 2024-02-24 RX ORDER — IBUPROFEN 200 MG
1 TABLET ORAL
Qty: 20 | Refills: 0
Start: 2024-02-24 | End: 2024-02-28

## 2024-02-24 RX ADMIN — Medication 25 MICROGRAM(S): at 05:57

## 2024-02-24 RX ADMIN — Medication 600 MILLIGRAM(S): at 00:17

## 2024-02-24 RX ADMIN — Medication 600 MILLIGRAM(S): at 01:17

## 2024-02-24 RX ADMIN — INFLUENZA VIRUS VACCINE 0.5 MILLILITER(S): 15; 15; 15; 15 SUSPENSION INTRAMUSCULAR at 06:06

## 2024-02-24 RX ADMIN — Medication 600 MILLIGRAM(S): at 06:57

## 2024-02-24 RX ADMIN — Medication 600 MILLIGRAM(S): at 05:57

## 2024-02-24 NOTE — PROGRESS NOTE ADULT - SUBJECTIVE AND OBJECTIVE BOX
Patient seen at bedside resting comfortably offers no current complaints.  Ambulating and voiding without difficulty.  Passing flatus and tolerating regular diet.  both breast/bottle feeding.  Denies HA, CP, SOB, N/V/D, dizziness, palpitations, worsening abdominal pain, worsening vaginal bleeding, or any other concerns.      Vital Signs Last 24 Hrs  T(C): 36.3 (24 Feb 2024 06:08), Max: 36.6 (23 Feb 2024 18:18)  T(F): 97.3 (24 Feb 2024 06:08), Max: 97.9 (23 Feb 2024 18:18)  HR: 60 (24 Feb 2024 06:08) (60 - 80)  BP: 112/76 (24 Feb 2024 06:08) (106/64 - 112/76)  BP(mean): --  RR: 18 (24 Feb 2024 06:08) (18 - 18)  SpO2: 97% (24 Feb 2024 06:08) (97% - 98%)    Parameters below as of 24 Feb 2024 06:08  Patient On (Oxygen Delivery Method): room air        Physical Exam:     Gen: A&Ox 3, NAD  Chest: CTA B/L  Cardiac: S1,S2; RRR  Breast: Soft, nontender, nonengorged  Abdomen: +BS; Soft, nontender, ND; Fundus firm below umbilicus  Gyn: Mod lochia  Ext: Nontender, DTRS 2+, no worsening edema                          9.9    14.37 )-----------( 173      ( 23 Feb 2024 06:20 )             29.0

## 2024-02-24 NOTE — PROGRESS NOTE ADULT - ASSESSMENT
A/P:  PPD#2 s/p     Acute blood loss anemia of expected blood loss.     - Discharge home with instructions  -Follow up in office in 5-6 weeks for postpartum visit  -Breastfeeding encouraged  - continue prenatal vitamins/ vit c    -d/w dr Maradiaga

## 2024-02-27 ENCOUNTER — APPOINTMENT (OUTPATIENT)
Dept: OBGYN | Facility: CLINIC | Age: 39
End: 2024-02-27

## 2024-02-27 PROBLEM — E03.9 HYPOTHYROIDISM, UNSPECIFIED: Chronic | Status: ACTIVE | Noted: 2024-02-22

## 2024-02-27 PROBLEM — E89.0 POSTPROCEDURAL HYPOTHYROIDISM: Chronic | Status: ACTIVE | Noted: 2024-02-22

## 2024-03-26 ENCOUNTER — APPOINTMENT (OUTPATIENT)
Dept: OBGYN | Facility: CLINIC | Age: 39
End: 2024-03-26
Payer: SELF-PAY

## 2024-03-26 VITALS — BODY MASS INDEX: 24.86 KG/M2 | SYSTOLIC BLOOD PRESSURE: 111 MMHG | DIASTOLIC BLOOD PRESSURE: 76 MMHG | WEIGHT: 154 LBS

## 2024-03-26 PROCEDURE — 99213 OFFICE O/P EST LOW 20 MIN: CPT

## 2024-03-28 ENCOUNTER — APPOINTMENT (OUTPATIENT)
Dept: OBGYN | Facility: CLINIC | Age: 39
End: 2024-03-28

## 2024-04-30 ENCOUNTER — NON-APPOINTMENT (OUTPATIENT)
Age: 39
End: 2024-04-30

## 2025-04-29 NOTE — DISCHARGE NOTE OB - MATERIALS PROVIDED
20 Vaccinations/[facility]  Screening Program/  Immunization Record/Breastfeeding Log/Breastfeeding Mother’s Support Group Information/Guide to Postpartum Care/[facility] Hearing Screen Program/Back To Sleep Handout/Shaken Baby Prevention Handout/Breastfeeding Guide and Packet/Birth Certificate Instructions/Discharge Medication Information for Patients and Families Pocket Guide